# Patient Record
Sex: FEMALE
[De-identification: names, ages, dates, MRNs, and addresses within clinical notes are randomized per-mention and may not be internally consistent; named-entity substitution may affect disease eponyms.]

---

## 2022-08-17 PROBLEM — Z00.00 ENCOUNTER FOR PREVENTIVE HEALTH EXAMINATION: Status: ACTIVE | Noted: 2022-08-17

## 2022-08-30 ENCOUNTER — APPOINTMENT (OUTPATIENT)
Dept: ORTHOPEDIC SURGERY | Facility: CLINIC | Age: 57
End: 2022-08-30

## 2022-08-30 VITALS — HEIGHT: 69 IN | BODY MASS INDEX: 23.11 KG/M2 | WEIGHT: 156 LBS

## 2022-08-30 DIAGNOSIS — M17.11 UNILATERAL PRIMARY OSTEOARTHRITIS, RIGHT KNEE: ICD-10-CM

## 2022-08-30 PROCEDURE — 73562 X-RAY EXAM OF KNEE 3: CPT | Mod: RT

## 2022-08-30 PROCEDURE — 99203 OFFICE O/P NEW LOW 30 MIN: CPT

## 2022-08-30 RX ORDER — ZOLPIDEM TARTRATE 12.5 MG/1
12.5 TABLET, COATED ORAL
Refills: 0 | Status: ACTIVE | COMMUNITY

## 2022-08-30 NOTE — DISCUSSION/SUMMARY
[de-identified] : At this time I discussed with the patient it is too soon for her to get a repeat gel or cortisone injection at this time.  I recommend she do some warm compresses, she can get a knee compression sleeve for support.  I gave her a script for physical therapy to work on doing some strengthening and to decrease some of the information.  She can weightbear as tolerated.  Anti-inflammatories as needed.  I offered her a full foot but she states she is going to follow up with the doctor and she has been seeing in [place]. Patient will call me if any other problems or concerns.  Patient verbalized understanding and agreed with the plan, all questions were answered in the office today.\par

## 2022-08-30 NOTE — HISTORY OF PRESENT ILLNESS
[de-identified] :  57-year-old female here today for evaluation of her right knee.  Patient has a history of knee arthritis.  She was seen here in April and had a Synvisc-One injection.  She states she lives in [place] and has been seeing an orthopedist there, she states she had a cortisone injection in the knee 2 weeks ago because she was having a lot of pain.  She states it was helping but on Saturday she twisted her right knee and started having pain again.  She states she is having difficulty walking and bending the knee.  She had no fall or direct trauma.  She denies any numbness or tingling radiating down the leg, she denies any calf pain.

## 2022-08-30 NOTE — IMAGING
[de-identified] :   On examination of her right knee, she has mild swelling, no erythema, no ecchymosis.  She is nontender patellar facets, negative patellar grind.  She is able to straight leg raise.  She can flex extend the knee but has pain with flexion.  Negative varus and valgus stress test, negative anterior drawer.  Tender over the medial joint line, she is nontender over the lateral joint line patellar or quadriceps tendon.  Calf is soft and nontender.  She is walking with an antalgic\par  gait.\par \par X-rays taken in the office today of the right knee show degenerative changes with bone on bone in the medial compartment.  No acute fractures, dislocations, or other bony abnormalities noted.

## 2023-10-27 ENCOUNTER — APPOINTMENT (OUTPATIENT)
Dept: OTOLARYNGOLOGY | Facility: CLINIC | Age: 58
End: 2023-10-27
Payer: COMMERCIAL

## 2023-10-27 ENCOUNTER — NON-APPOINTMENT (OUTPATIENT)
Age: 58
End: 2023-10-27

## 2023-10-27 VITALS — HEIGHT: 68 IN | WEIGHT: 162 LBS | BODY MASS INDEX: 24.55 KG/M2

## 2023-10-27 DIAGNOSIS — H61.23 IMPACTED CERUMEN, BILATERAL: ICD-10-CM

## 2023-10-27 PROCEDURE — 99204 OFFICE O/P NEW MOD 45 MIN: CPT | Mod: 25

## 2023-10-27 PROCEDURE — 69210 REMOVE IMPACTED EAR WAX UNI: CPT

## 2023-10-27 PROCEDURE — 21320 CLSD TX NSL FX W/MNPJ&STABLJ: CPT

## 2023-10-27 PROCEDURE — 31231 NASAL ENDOSCOPY DX: CPT

## 2023-11-06 ENCOUNTER — APPOINTMENT (OUTPATIENT)
Dept: OTOLARYNGOLOGY | Facility: CLINIC | Age: 58
End: 2023-11-06
Payer: COMMERCIAL

## 2023-11-06 DIAGNOSIS — H93.8X3 OTHER SPECIFIED DISORDERS OF EAR, BILATERAL: ICD-10-CM

## 2023-11-06 DIAGNOSIS — S09.92XA UNSPECIFIED INJURY OF NOSE, INITIAL ENCOUNTER: ICD-10-CM

## 2023-11-06 DIAGNOSIS — S02.2XXA FRACTURE OF NASAL BONES, INITIAL ENCOUNTER FOR CLOSED FRACTURE: ICD-10-CM

## 2023-11-06 PROCEDURE — 99213 OFFICE O/P EST LOW 20 MIN: CPT | Mod: 25

## 2023-11-06 PROCEDURE — 31231 NASAL ENDOSCOPY DX: CPT

## 2025-03-05 ENCOUNTER — OUTPATIENT (OUTPATIENT)
Dept: OUTPATIENT SERVICES | Facility: HOSPITAL | Age: 60
LOS: 1 days | End: 2025-03-05
Payer: COMMERCIAL

## 2025-03-05 DIAGNOSIS — I25.10 ATHEROSCLEROTIC HEART DISEASE OF NATIVE CORONARY ARTERY WITHOUT ANGINA PECTORIS: ICD-10-CM

## 2025-03-05 DIAGNOSIS — Z00.8 ENCOUNTER FOR OTHER GENERAL EXAMINATION: ICD-10-CM

## 2025-03-05 PROCEDURE — 75574 CT ANGIO HRT W/3D IMAGE: CPT | Mod: 26

## 2025-03-05 PROCEDURE — 75574 CT ANGIO HRT W/3D IMAGE: CPT

## 2025-03-06 DIAGNOSIS — I25.10 ATHEROSCLEROTIC HEART DISEASE OF NATIVE CORONARY ARTERY WITHOUT ANGINA PECTORIS: ICD-10-CM

## 2025-06-17 ENCOUNTER — NON-APPOINTMENT (OUTPATIENT)
Age: 60
End: 2025-06-17